# Patient Record
(demographics unavailable — no encounter records)

---

## 2024-11-19 NOTE — HISTORY OF PRESENT ILLNESS
[FreeTextEntry1] : This is a 39y.o F with medical hx of depression, allergy induced asthma on immunotherapy here today to establish care. She feels well today, offers no complaints.    Denies chest pain, palpitations, diaphoresis, vision changes, HA, dizziness, syncope, cough, wheezing, edema, fever, chills, infection.

## 2024-11-19 NOTE — PHYSICAL EXAM

## 2025-04-09 NOTE — PHYSICAL EXAM
[No Acute Distress] : no acute distress [Well Nourished] : well nourished [Well Developed] : well developed [Well-Appearing] : well-appearing [Normal Sclera/Conjunctiva] : normal sclera/conjunctiva [PERRL] : pupils equal round and reactive to light [EOMI] : extraocular movements intact [Normal Outer Ear/Nose] : the outer ears and nose were normal in appearance [Normal Oropharynx] : the oropharynx was normal [No JVD] : no jugular venous distention [No Lymphadenopathy] : no lymphadenopathy [Supple] : supple [Thyroid Normal, No Nodules] : the thyroid was normal and there were no nodules present [No Respiratory Distress] : no respiratory distress  [No Accessory Muscle Use] : no accessory muscle use [Clear to Auscultation] : lungs were clear to auscultation bilaterally [Normal Rate] : normal rate  [Regular Rhythm] : with a regular rhythm [Normal S1, S2] : normal S1 and S2 [No Murmur] : no murmur heard [No Carotid Bruits] : no carotid bruits [No Varicosities] : no varicosities [Pedal Pulses Present] : the pedal pulses are present [No Edema] : there was no peripheral edema [No Extremity Clubbing/Cyanosis] : no extremity clubbing/cyanosis [Soft] : abdomen soft [Non-distended] : non-distended [Normal Bowel Sounds] : normal bowel sounds [Normal Posterior Cervical Nodes] : no posterior cervical lymphadenopathy [Normal Anterior Cervical Nodes] : no anterior cervical lymphadenopathy [No CVA Tenderness] : no CVA  tenderness [No Spinal Tenderness] : no spinal tenderness [No Joint Swelling] : no joint swelling [Grossly Normal Strength/Tone] : grossly normal strength/tone [No Rash] : no rash [Coordination Grossly Intact] : coordination grossly intact [No Focal Deficits] : no focal deficits [Normal Gait] : normal gait [Normal Affect] : the affect was normal [Normal Insight/Judgement] : insight and judgment were intact [de-identified] : mild RUQ tenderness

## 2025-04-09 NOTE — HEALTH RISK ASSESSMENT
[0] : 2) Feeling down, depressed, or hopeless: Not at all (0) [PHQ-2 Negative - No further assessment needed] : PHQ-2 Negative - No further assessment needed [Former] : Former [[id number]] : 0

## 2025-04-09 NOTE — HISTORY OF PRESENT ILLNESS
[FreeTextEntry1] : pain under right ribcage  [de-identified] : Ms. KATHLEEN is a 39 year old F with PMHx of depression, allergy induced asthma on immunotherapy presenting for sharp pain under right ribcage that radiates into stomach and shoulder blade starting Saturday night. She had a fever of 100.3F but fever has resolved. Pain is worse when raising her arms, standing up straight, and taking deep breaths. Pain is not affected by walking. She is taking ibuprofen which helps. Pt also reports worsening GERD. Denies chest pain, sob, sheth, dizziness, diaphoresis, palpitations, LE swelling, orthopnea, syncope, n/v, headache.

## 2025-04-09 NOTE — ADDENDUM
[FreeTextEntry1] : This note was written by Magda Shelby on 04/09/2025 acting as medical scribe for Dr. Ken Ferrera. I, Dr. Ken Ferrera, have read and attest that all the information, medical decision making and discharge instructions within are true and accurate.

## 2025-06-01 NOTE — HISTORY OF PRESENT ILLNESS
[FreeTextEntry1] : f/u [de-identified] : Ms. KATHLEEN is a 39 year old F with PMHx of depression, allergy induced asthma on immunotherapy presenting for f/u. Pt reports intermittent  RUQ abd pain that resolves on its own, comes and goes every few months. Pt did not f/u gi as rec. Pt had negative RUQ sono and CT abd/pelvis 4/2025. Pt is requesting pain medication since she is travelling to [place] soon for 3 weeks, pt says she wants opoids only, does not want anything less strong. Pt also reports tingling in her right hand at night which occasionally radiates into her arm, mentions neck pain when driving. Denies chest pain, sob, sheth, dizziness, diaphoresis, palpitations, LE swelling, orthopnea, syncope, n/v, headache.

## 2025-06-01 NOTE — HEALTH RISK ASSESSMENT
[0] : 2) Feeling down, depressed, or hopeless: Not at all (0) [PHQ-2 Negative - No further assessment needed] : PHQ-2 Negative - No further assessment needed [Former] : Former [[id number]] : 0

## 2025-06-01 NOTE — HEALTH RISK ASSESSMENT
[0] : 2) Feeling down, depressed, or hopeless: Not at all (0) [PHQ-2 Negative - No further assessment needed] : PHQ-2 Negative - No further assessment needed [Former] : Former [[id number]] : 0

## 2025-06-01 NOTE — HEALTH RISK ASSESSMENT
[0] : 2) Feeling down, depressed, or hopeless: Not at all (0) [PHQ-2 Negative - No further assessment needed] : PHQ-2 Negative - No further assessment needed [Former] : Former [[id number]] : 0

## 2025-06-01 NOTE — PHYSICAL EXAM
-- Message is from the [facility]--    Reason for Call: Patient's cousinQueen is requesting a call back regarding wether or not the patient has to have a lab done before appointment on 9/3/19.    Caller Information       Type Contact Phone    08/02/2019 02:45 PM Phone (Incoming) [facility] (Emergency Contact) [phone] (H)          Alternative phone number: N/a    Turnaround time given to caller:   \"This message will be sent to [state Provider's name]. The clinical team will fulfill your request as soon as they review your message.\"     [No Acute Distress] : no acute distress [Well Nourished] : well nourished [Well Developed] : well developed [Well-Appearing] : well-appearing [Normal Sclera/Conjunctiva] : normal sclera/conjunctiva [PERRL] : pupils equal round and reactive to light [EOMI] : extraocular movements intact [Normal Outer Ear/Nose] : the outer ears and nose were normal in appearance [Normal Oropharynx] : the oropharynx was normal [No JVD] : no jugular venous distention [No Lymphadenopathy] : no lymphadenopathy [Supple] : supple [Thyroid Normal, No Nodules] : the thyroid was normal and there were no nodules present [No Respiratory Distress] : no respiratory distress  [No Accessory Muscle Use] : no accessory muscle use [Clear to Auscultation] : lungs were clear to auscultation bilaterally [Normal Rate] : normal rate  [Regular Rhythm] : with a regular rhythm [Normal S1, S2] : normal S1 and S2 [No Murmur] : no murmur heard [No Carotid Bruits] : no carotid bruits [No Varicosities] : no varicosities [Pedal Pulses Present] : the pedal pulses are present [No Edema] : there was no peripheral edema [No Extremity Clubbing/Cyanosis] : no extremity clubbing/cyanosis [Soft] : abdomen soft [Non Tender] : non-tender [Non-distended] : non-distended [Normal Bowel Sounds] : normal bowel sounds [Normal Posterior Cervical Nodes] : no posterior cervical lymphadenopathy [Normal Anterior Cervical Nodes] : no anterior cervical lymphadenopathy [No CVA Tenderness] : no CVA  tenderness [No Spinal Tenderness] : no spinal tenderness [No Joint Swelling] : no joint swelling [Grossly Normal Strength/Tone] : grossly normal strength/tone [No Rash] : no rash [Coordination Grossly Intact] : coordination grossly intact [No Focal Deficits] : no focal deficits [Normal Gait] : normal gait [Normal Affect] : the affect was normal [Normal Insight/Judgement] : insight and judgment were intact [Alert and Oriented x3] : oriented to person, place, and time

## 2025-06-01 NOTE — HISTORY OF PRESENT ILLNESS
[FreeTextEntry1] : f/u [de-identified] : Ms. KATHLEEN is a 39 year old F with PMHx of depression, allergy induced asthma on immunotherapy presenting for f/u. Pt reports intermittent  RUQ abd pain that resolves on its own, comes and goes every few months. Pt did not f/u gi as rec. Pt had negative RUQ sono and CT abd/pelvis 4/2025. Pt is requesting pain medication since she is travelling to [place] soon for 3 weeks, pt says she wants opoids only, does not want anything less strong. Pt also reports tingling in her right hand at night which occasionally radiates into her arm, mentions neck pain when driving. Denies chest pain, sob, sheth, dizziness, diaphoresis, palpitations, LE swelling, orthopnea, syncope, n/v, headache.

## 2025-06-01 NOTE — HISTORY OF PRESENT ILLNESS
[FreeTextEntry1] : f/u [de-identified] : Ms. KATHLEEN is a 39 year old F with PMHx of depression, allergy induced asthma on immunotherapy presenting for f/u. Pt reports intermittent  RUQ abd pain that resolves on its own, comes and goes every few months. Pt did not f/u gi as rec. Pt had negative RUQ sono and CT abd/pelvis 4/2025. Pt is requesting pain medication since she is travelling to [place] soon for 3 weeks, pt says she wants opoids only, does not want anything less strong. Pt also reports tingling in her right hand at night which occasionally radiates into her arm, mentions neck pain when driving. Denies chest pain, sob, sheth, dizziness, diaphoresis, palpitations, LE swelling, orthopnea, syncope, n/v, headache.

## 2025-06-01 NOTE — ADDENDUM
[FreeTextEntry1] : This note was written by Magda Shelby on 05/28/2025 acting as medical scribe for Dr. Ken Ferrera. I, Dr. Ken Ferrera, have read and attest that all the information, medical decision making and discharge instructions within are true and accurate.